# Patient Record
Sex: FEMALE | Race: WHITE | URBAN - NONMETROPOLITAN AREA
[De-identification: names, ages, dates, MRNs, and addresses within clinical notes are randomized per-mention and may not be internally consistent; named-entity substitution may affect disease eponyms.]

---

## 2022-02-01 ENCOUNTER — LAB OUTSIDE AN ENCOUNTER (OUTPATIENT)
Dept: URBAN - NONMETROPOLITAN AREA CLINIC 2 | Facility: CLINIC | Age: 62
End: 2022-02-01

## 2022-02-01 ENCOUNTER — WEB ENCOUNTER (OUTPATIENT)
Dept: URBAN - NONMETROPOLITAN AREA CLINIC 2 | Facility: CLINIC | Age: 62
End: 2022-02-01

## 2022-02-01 ENCOUNTER — OFFICE VISIT (OUTPATIENT)
Dept: URBAN - NONMETROPOLITAN AREA CLINIC 2 | Facility: CLINIC | Age: 62
End: 2022-02-01

## 2022-02-01 DIAGNOSIS — R19.7 DIARRHEA, UNSPECIFIED TYPE: ICD-10-CM

## 2022-02-01 DIAGNOSIS — Z12.11 SCREENING FOR COLON CANCER: ICD-10-CM

## 2022-02-01 DIAGNOSIS — K52.839 MICROSCOPIC COLITIS, UNSPECIFIED MICROSCOPIC COLITIS TYPE: ICD-10-CM

## 2022-02-01 PROCEDURE — 99203 OFFICE O/P NEW LOW 30 MIN: CPT | Performed by: INTERNAL MEDICINE

## 2022-02-01 PROCEDURE — 99243 OFF/OP CNSLTJ NEW/EST LOW 30: CPT | Performed by: INTERNAL MEDICINE

## 2022-02-01 RX ORDER — SODIUM, POTASSIUM,MAG SULFATES 17.5-3.13G
354 ML SOLUTION, RECONSTITUTED, ORAL ORAL
Qty: 354 MILLILITER | Refills: 0 | OUTPATIENT
Start: 2022-02-01 | End: 2022-02-02

## 2022-02-01 NOTE — HPI-TODAY'S VISIT:
2/1/2022 The patient is a 61-year-old female who is referred by Dr. Kiara Paige for evaluation and treatment of chronic diarrhea.  A copy of this consultation will be sent to the referring physician.  The patient is a 61-year-old female who was diagnosed with microscopic colitis about 3 years ago.  She was seen by Dr. Torres, and at that time had blood work done along with a colonoscopy.  We do not have these biopsy results.  She was not told whether it was lymphocytic colitis or collagenous colitis.  Her mother does have lymphocytic colitis.  She did not want to take steroids, so she treated her symptoms with diet.  She eliminated gluten and dairy.  She is also decreased the amount of fiber in her diet.  This does help somewhat.  She will occasionally take an Imodium, but this teen tends to cause her to have constipation.  Over the past 2 days, she is also been constipated.  This is definitely not normal for her, but she has had difficulty having a bowel movement.  We have discussed taking some Metamucil type fiber.  She does eat a very healthy high-fiber diet, and we have discussed taking psyllium fiber in addition due to the fact she has eliminated some of the fiber to decrease her diarrhea.  At this point, I also think we should recheck her colonoscopy with biopsies to determine if we are treating IBS versus lymphocytic colitis.  We have discussed treatment including steroids.  We are going to check her labs today including celiac titers as well.  We will see her back in the office after her colonoscopy for further evaluation.  She denies any weight loss or blood in her stool.  The diarrhea does not wake her up at night.

## 2022-02-03 LAB
A/G RATIO: 1.7
ALBUMIN: 4.5
ALKALINE PHOSPHATASE: 80
ALT (SGPT): 12
AST (SGOT): 10
BASO (ABSOLUTE): 0
BASOS: 0
BILIRUBIN, TOTAL: 0.4
BUN/CREATININE RATIO: 13
BUN: 10
CALCIUM: 9.4
CARBON DIOXIDE, TOTAL: 25
CHLORIDE: 106
CREATININE: 0.76
DEAMIDATED GLIADIN ABS, IGA: 7
DEAMIDATED GLIADIN ABS, IGG: 3
EGFR IF AFRICN AM: 97
EGFR IF NONAFRICN AM: 84
ENDOMYSIAL ANTIBODY IGA: NEGATIVE
EOS (ABSOLUTE): 0.1
EOS: 2
GLOBULIN, TOTAL: 2.7
GLUCOSE: 83
HEMATOCRIT: 40.8
HEMATOLOGY COMMENTS:: (no result)
HEMOGLOBIN: 14
IMMATURE CELLS: (no result)
IMMATURE GRANS (ABS): 0
IMMATURE GRANULOCYTES: 0
IMMUNOGLOBULIN A, QN, SERUM: 158
LYMPHS (ABSOLUTE): 1.5
LYMPHS: 33
MCH: 31.7
MCHC: 34.3
MCV: 93
MONOCYTES(ABSOLUTE): 0.2
MONOCYTES: 4
NEUTROPHILS (ABSOLUTE): 2.7
NEUTROPHILS: 61
NRBC: (no result)
PLATELETS: 308
POTASSIUM: 4.4
PROTEIN, TOTAL: 7.2
RBC: 4.41
RDW: 12.6
SODIUM: 144
T-TRANSGLUTAMINASE (TTG) IGA: <2
T-TRANSGLUTAMINASE (TTG) IGG: 4
WBC: 4.5

## 2022-02-18 ENCOUNTER — TELEPHONE ENCOUNTER (OUTPATIENT)
Dept: URBAN - NONMETROPOLITAN AREA CLINIC 2 | Facility: CLINIC | Age: 62
End: 2022-02-18

## 2022-04-20 ENCOUNTER — OFFICE VISIT (OUTPATIENT)
Dept: URBAN - NONMETROPOLITAN AREA SURGERY CENTER 1 | Facility: SURGERY CENTER | Age: 62
End: 2022-04-20

## 2022-04-20 DIAGNOSIS — R19.7 ACUTE DIARRHEA: ICD-10-CM

## 2022-04-20 DIAGNOSIS — K52.832 FOCAL LYMPHOCYTIC COLITIS: ICD-10-CM

## 2022-04-20 PROCEDURE — G8907 PT DOC NO EVENTS ON DISCHARG: HCPCS | Performed by: INTERNAL MEDICINE

## 2022-04-20 PROCEDURE — 45380 COLONOSCOPY AND BIOPSY: CPT | Performed by: INTERNAL MEDICINE

## 2022-04-28 ENCOUNTER — TELEPHONE ENCOUNTER (OUTPATIENT)
Dept: URBAN - METROPOLITAN AREA CLINIC 92 | Facility: CLINIC | Age: 62
End: 2022-04-28

## 2022-07-21 ENCOUNTER — OFFICE VISIT (OUTPATIENT)
Dept: URBAN - NONMETROPOLITAN AREA CLINIC 2 | Facility: CLINIC | Age: 62
End: 2022-07-21

## 2022-07-21 VITALS — HEIGHT: 66 IN

## 2022-07-21 DIAGNOSIS — R19.7 DIARRHEA, UNSPECIFIED TYPE: ICD-10-CM

## 2022-07-21 DIAGNOSIS — Z12.11 SCREENING FOR COLON CANCER: ICD-10-CM

## 2022-07-21 DIAGNOSIS — K52.839 MICROSCOPIC COLITIS, UNSPECIFIED MICROSCOPIC COLITIS TYPE: ICD-10-CM

## 2022-07-21 PROCEDURE — 99214 OFFICE O/P EST MOD 30 MIN: CPT | Performed by: NURSE PRACTITIONER

## 2022-07-21 RX ORDER — BUDESONIDE 3 MG/1
3 CAP PO DAILY CAPSULE, COATED PELLETS ORAL ONCE A DAY
Qty: 90 CAPSULE | Refills: 1 | OUTPATIENT

## 2022-07-21 NOTE — HPI-TODAY'S VISIT:
2/1/2022 The patient is a 61-year-old female who is referred by Dr. Kiara Paige for evaluation and treatment of chronic diarrhea.  A copy of this consultation will be sent to the referring physician.  The patient is a 61-year-old female who was diagnosed with microscopic colitis about 3 years ago.  She was seen by Dr. Torres, and at that time had blood work done along with a colonoscopy.  We do not have these biopsy results.  She was not told whether it was lymphocytic colitis or collagenous colitis.  Her mother does have lymphocytic colitis.  She did not want to take steroids, so she treated her symptoms with diet.  She eliminated gluten and dairy.  She is also decreased the amount of fiber in her diet.  This does help somewhat.  She will occasionally take an Imodium, but this teen tends to cause her to have constipation.  Over the past 2 days, she is also been constipated.  This is definitely not normal for her, but she has had difficulty having a bowel movement.  We have discussed taking some Metamucil type fiber.  She does eat a very healthy high-fiber diet, and we have discussed taking psyllium fiber in addition due to the fact she has eliminated some of the fiber to decrease her diarrhea.  At this point, I also think we should recheck her colonoscopy with biopsies to determine if we are treating IBS versus lymphocytic colitis.  We have discussed treatment including steroids.  We are going to check her labs today including celiac titers as well.  We will see her back in the office after her colonoscopy for further evaluation.  She denies any weight loss or blood in her stool.  The diarrhea does not wake her up at night. 7/21/2022 Keisha presents for follow-up of lymphocytic colitis.  Since her last visit her colonoscopy reveals lymphocytic colitis on random biopsies.  Her celiac panel was normal.  She continues to struggle with multiple loose bowel movements daily.  She has not tried the Florastor.  She is taking an organic psyllium found powder which she drinks in her aloe vera and coconut juice.  Today we have had a long discussion regarding her symptoms.  Her mother is tremendously better on Florastor daily.  She agrees to start Florastor 1-2 daily.  If she does have to take an antibiotic for an ear infection I would increase that to twice daily regularly while on the antibiotic and for a month afterward.  MB

## 2022-10-27 ENCOUNTER — OFFICE VISIT (OUTPATIENT)
Dept: URBAN - NONMETROPOLITAN AREA CLINIC 2 | Facility: CLINIC | Age: 62
End: 2022-10-27

## 2022-10-27 VITALS
WEIGHT: 155 LBS | SYSTOLIC BLOOD PRESSURE: 103 MMHG | TEMPERATURE: 97.8 F | DIASTOLIC BLOOD PRESSURE: 67 MMHG | HEART RATE: 54 BPM | HEIGHT: 66 IN | BODY MASS INDEX: 24.91 KG/M2

## 2022-10-27 DIAGNOSIS — Z12.11 SCREENING FOR COLON CANCER: ICD-10-CM

## 2022-10-27 DIAGNOSIS — R19.7 DIARRHEA, UNSPECIFIED TYPE: ICD-10-CM

## 2022-10-27 DIAGNOSIS — K52.839 MICROSCOPIC COLITIS, UNSPECIFIED MICROSCOPIC COLITIS TYPE: ICD-10-CM

## 2022-10-27 PROCEDURE — 99212 OFFICE O/P EST SF 10 MIN: CPT | Performed by: NURSE PRACTITIONER

## 2022-10-27 RX ORDER — BUDESONIDE 3 MG/1
3 CAP PO DAILY CAPSULE, COATED PELLETS ORAL ONCE A DAY
Qty: 90 CAPSULE | Refills: 1

## 2022-10-27 RX ORDER — BUDESONIDE 3 MG/1
3 CAP PO DAILY CAPSULE, COATED PELLETS ORAL ONCE A DAY
Qty: 90 CAPSULE | Refills: 1 | Status: ACTIVE | COMMUNITY

## 2022-10-27 RX ORDER — COLESTIPOL HYDROCHLORIDE 1 G/1
1 TABLET TWICE DAILY FOR DIARRHEA TABLET, FILM COATED ORAL
Qty: 60 TABLET | Refills: 3 | OUTPATIENT
Start: 2022-10-27

## 2022-10-27 NOTE — HPI-TODAY'S VISIT:
2/1/2022 The patient is a 61-year-old female who is referred by Dr. Kiara Paige for evaluation and treatment of chronic diarrhea.  A copy of this consultation will be sent to the referring physician.  The patient is a 61-year-old female who was diagnosed with microscopic colitis about 3 years ago.  She was seen by Dr. Torres, and at that time had blood work done along with a colonoscopy.  We do not have these biopsy results.  She was not told whether it was lymphocytic colitis or collagenous colitis.  Her mother does have lymphocytic colitis.  She did not want to take steroids, so she treated her symptoms with diet.  She eliminated gluten and dairy.  She is also decreased the amount of fiber in her diet.  This does help somewhat.  She will occasionally take an Imodium, but this teen tends to cause her to have constipation.  Over the past 2 days, she is also been constipated.  This is definitely not normal for her, but she has had difficulty having a bowel movement.  We have discussed taking some Metamucil type fiber.  She does eat a very healthy high-fiber diet, and we have discussed taking psyllium fiber in addition due to the fact she has eliminated some of the fiber to decrease her diarrhea.  At this point, I also think we should recheck her colonoscopy with biopsies to determine if we are treating IBS versus lymphocytic colitis.  We have discussed treatment including steroids.  We are going to check her labs today including celiac titers as well.  We will see her back in the office after her colonoscopy for further evaluation.  She denies any weight loss or blood in her stool.  The diarrhea does not wake her up at night. 7/21/2022 Keisha presents for follow-up of lymphocytic colitis.  Since her last visit her colonoscopy reveals lymphocytic colitis on random biopsies.  Her celiac panel was normal.  She continues to struggle with multiple loose bowel movements daily.  She has not tried the Florastor.  She is taking an organic psyllium found powder which she drinks in her aloe vera and coconut juice.  Today we have had a long discussion regarding her symptoms.  Her mother is tremendously better on Florastor daily.  She agrees to start Florastor 1-2 daily.  If she does have to take an antibiotic for an ear infection I would increase that to twice daily regularly while on the antibiotic and for a month afterward.  MB 10/27/2022 Keisha presents for follow-up of lymphocytic colitis.  Since her last visit she has been doing Florastor twice daily along with increasing her nutritional fiber with no relief.  She continues to have multiple loose bowel movements daily.  Today we have discussed colestipol versus budesonide she agrees to give colestipol 1 g twice daily a try, if no relief she agrees to an 8-week course of budesonide.  MB

## 2023-01-26 ENCOUNTER — OFFICE VISIT (OUTPATIENT)
Dept: URBAN - NONMETROPOLITAN AREA CLINIC 2 | Facility: CLINIC | Age: 63
End: 2023-01-26

## 2023-01-26 ENCOUNTER — LAB OUTSIDE AN ENCOUNTER (OUTPATIENT)
Dept: URBAN - NONMETROPOLITAN AREA CLINIC 2 | Facility: CLINIC | Age: 63
End: 2023-01-26

## 2023-01-26 ENCOUNTER — WEB ENCOUNTER (OUTPATIENT)
Dept: URBAN - NONMETROPOLITAN AREA CLINIC 2 | Facility: CLINIC | Age: 63
End: 2023-01-26

## 2023-01-26 VITALS
HEIGHT: 66 IN | WEIGHT: 150.8 LBS | DIASTOLIC BLOOD PRESSURE: 69 MMHG | TEMPERATURE: 98.7 F | HEART RATE: 58 BPM | SYSTOLIC BLOOD PRESSURE: 105 MMHG | BODY MASS INDEX: 24.23 KG/M2

## 2023-01-26 DIAGNOSIS — R10.11 RUQ ABDOMINAL PAIN: ICD-10-CM

## 2023-01-26 DIAGNOSIS — K52.839 MICROSCOPIC COLITIS, UNSPECIFIED MICROSCOPIC COLITIS TYPE: ICD-10-CM

## 2023-01-26 DIAGNOSIS — R11.0 NAUSEA: ICD-10-CM

## 2023-01-26 DIAGNOSIS — Z12.11 SCREENING FOR COLON CANCER: ICD-10-CM

## 2023-01-26 DIAGNOSIS — R19.7 DIARRHEA, UNSPECIFIED TYPE: ICD-10-CM

## 2023-01-26 PROBLEM — 305058001: Status: ACTIVE | Noted: 2022-02-01

## 2023-01-26 PROBLEM — 422587007: Status: ACTIVE | Noted: 2023-01-26

## 2023-01-26 PROBLEM — 301717006: Status: ACTIVE | Noted: 2023-01-26

## 2023-01-26 PROBLEM — 62315008: Status: ACTIVE | Noted: 2022-02-01

## 2023-01-26 PROBLEM — 235753003: Status: ACTIVE | Noted: 2022-02-01

## 2023-01-26 PROCEDURE — 99214 OFFICE O/P EST MOD 30 MIN: CPT | Performed by: NURSE PRACTITIONER

## 2023-01-26 RX ORDER — COLESTIPOL HYDROCHLORIDE 1 G/1
1 TABLET TWICE DAILY FOR DIARRHEA TABLET, FILM COATED ORAL
Qty: 60 TABLET | Refills: 3 | Status: ACTIVE | COMMUNITY
Start: 2022-10-27

## 2023-01-26 RX ORDER — BUDESONIDE 3 MG/1
3 CAP PO DAILY CAPSULE, COATED PELLETS ORAL ONCE A DAY
Qty: 90 CAPSULE | Refills: 1 | Status: ACTIVE | COMMUNITY

## 2023-01-26 NOTE — HPI-TODAY'S VISIT:
2/1/2022 The patient is a 61-year-old female who is referred by Dr. Kiara Paige for evaluation and treatment of chronic diarrhea.  A copy of this consultation will be sent to the referring physician.  The patient is a 61-year-old female who was diagnosed with microscopic colitis about 3 years ago.  She was seen by Dr. Torres, and at that time had blood work done along with a colonoscopy.  We do not have these biopsy results.  She was not told whether it was lymphocytic colitis or collagenous colitis.  Her mother does have lymphocytic colitis.  She did not want to take steroids, so she treated her symptoms with diet.  She eliminated gluten and dairy.  She is also decreased the amount of fiber in her diet.  This does help somewhat.  She will occasionally take an Imodium, but this teen tends to cause her to have constipation.  Over the past 2 days, she is also been constipated.  This is definitely not normal for her, but she has had difficulty having a bowel movement.  We have discussed taking some Metamucil type fiber.  She does eat a very healthy high-fiber diet, and we have discussed taking psyllium fiber in addition due to the fact she has eliminated some of the fiber to decrease her diarrhea.  At this point, I also think we should recheck her colonoscopy with biopsies to determine if we are treating IBS versus lymphocytic colitis.  We have discussed treatment including steroids.  We are going to check her labs today including celiac titers as well.  We will see her back in the office after her colonoscopy for further evaluation.  She denies any weight loss or blood in her stool.  The diarrhea does not wake her up at night. 7/21/2022 Keisha presents for follow-up of lymphocytic colitis.  Since her last visit her colonoscopy reveals lymphocytic colitis on random biopsies.  Her celiac panel was normal.  She continues to struggle with multiple loose bowel movements daily.  She has not tried the Florastor.  She is taking an organic psyllium found powder which she drinks in her aloe vera and coconut juice.  Today we have had a long discussion regarding her symptoms.  Her mother is tremendously better on Florastor daily.  She agrees to start Florastor 1-2 daily.  If she does have to take an antibiotic for an ear infection I would increase that to twice daily regularly while on the antibiotic and for a month afterward.  MB 10/27/2022 Keisha presents for follow-up of lymphocytic colitis.  Since her last visit she has been doing Florastor twice daily along with increasing her nutritional fiber with no relief.  She continues to have multiple loose bowel movements daily.  Today we have discussed colestipol versus budesonide she agrees to give colestipol 1 g twice daily a try, if no relief she agrees to an 8-week course of budesonide.  MB  1/26/2023 Keisha presents for follow-up of microscopic colitis.  She is on colestipol 1 g daily with resolution of her symptoms.  She did not want to try the budesonide.  She is taking Florastor.  Recently she has had increased right upper quadrant abdominal pain with radiation to her back.  She has a history of gallbladder disease diagnosed years ago however she did not pursue cholecystectomy, we do not have this work-up.  She feels that her symptoms are similar.  She does have some nausea associated with the pain.  She has not had an upper endoscopy in the recent past.  Today she would like to pursue gallbladder work-up, if this is normal she agrees to upper endoscopy plus or minus Pepcid.  Would continue to avoid food triggers, she is a vegetarian and noticed this worse after meals with butter. Today she is doing well otherwise.  MB

## 2023-01-27 LAB
A/G RATIO: 1.6
ABSOLUTE BASOPHILS: 22
ABSOLUTE EOSINOPHILS: 62
ABSOLUTE LYMPHOCYTES: 1404
ABSOLUTE MONOCYTES: 220
ABSOLUTE NEUTROPHILS: 2693
ALBUMIN: 3.9
ALKALINE PHOSPHATASE: 57
ALT (SGPT): 9
AST (SGOT): 13
BASOPHILS: 0.5
BILIRUBIN, TOTAL: 0.5
BUN/CREATININE RATIO: (no result)
BUN: 10
CALCIUM: 9.1
CARBON DIOXIDE, TOTAL: 31
CHLORIDE: 109
CREATININE: 0.72
EGFR: 93
EOSINOPHILS: 1.4
GLOBULIN, TOTAL: 2.4
GLUCOSE: 88
H PYLORI BREATH TEST: NOT DETECTED
H. PYLORI BREATH TEST: NOT DETECTED
HEMATOCRIT: 38.6
HEMOGLOBIN: 13.3
IMMUNOGLOBULIN A: 150
INTERPRETATION: (no result)
INTERPRETATION: NOT DETECTED
LIPASE: 26
LYMPHOCYTES: 31.9
MCH: 31
MCHC: 34.5
MCV: 90
MONOCYTES: 5
MPV: 10.5
NEUTROPHILS: 61.2
PLATELET COUNT: 260
POTASSIUM: 4.4
PROTEIN, TOTAL: 6.3
RDW: 12
RED BLOOD CELL COUNT: 4.29
SODIUM: 143
TISSUE TRANSGLUTAMINASE AB, IGA: <1
WHITE BLOOD CELL COUNT: 4.4

## 2023-02-14 ENCOUNTER — TELEPHONE ENCOUNTER (OUTPATIENT)
Dept: URBAN - METROPOLITAN AREA CLINIC 92 | Facility: CLINIC | Age: 63
End: 2023-02-14

## 2023-02-14 PROBLEM — 197433003: Status: ACTIVE | Noted: 2023-02-14

## 2023-06-01 ENCOUNTER — OFFICE VISIT (OUTPATIENT)
Dept: URBAN - NONMETROPOLITAN AREA CLINIC 2 | Facility: CLINIC | Age: 63
End: 2023-06-01

## 2023-10-30 ENCOUNTER — OFFICE VISIT (OUTPATIENT)
Dept: URBAN - NONMETROPOLITAN AREA CLINIC 2 | Facility: CLINIC | Age: 63
End: 2023-10-30

## 2024-02-26 ENCOUNTER — LAB (OUTPATIENT)
Dept: URBAN - NONMETROPOLITAN AREA CLINIC 2 | Facility: CLINIC | Age: 64
End: 2024-02-26

## 2024-02-26 ENCOUNTER — OV EP (OUTPATIENT)
Dept: URBAN - NONMETROPOLITAN AREA CLINIC 2 | Facility: CLINIC | Age: 64
End: 2024-02-26
Payer: MEDICARE

## 2024-02-26 VITALS
TEMPERATURE: 98.7 F | HEART RATE: 64 BPM | DIASTOLIC BLOOD PRESSURE: 62 MMHG | WEIGHT: 148.8 LBS | SYSTOLIC BLOOD PRESSURE: 97 MMHG | HEIGHT: 66 IN | BODY MASS INDEX: 23.91 KG/M2

## 2024-02-26 DIAGNOSIS — K82.4 GALLBLADDER POLYP: ICD-10-CM

## 2024-02-26 DIAGNOSIS — R10.11 RUQ ABDOMINAL PAIN: ICD-10-CM

## 2024-02-26 DIAGNOSIS — R19.7 ACUTE DIARRHEA: ICD-10-CM

## 2024-02-26 DIAGNOSIS — K52.89 MICROSCOPIC COLITIS: ICD-10-CM

## 2024-02-26 PROCEDURE — 99214 OFFICE O/P EST MOD 30 MIN: CPT | Performed by: NURSE PRACTITIONER

## 2024-02-26 RX ORDER — BUDESONIDE 3 MG/1
3 CAP PO DAILY CAPSULE, COATED PELLETS ORAL ONCE A DAY
Qty: 90 CAPSULE | Refills: 1 | Status: ON HOLD | COMMUNITY

## 2024-02-26 RX ORDER — COLESTIPOL HYDROCHLORIDE 1 G/1
1 TABLET TWICE DAILY FOR DIARRHEA TABLET, FILM COATED ORAL
Qty: 60 TABLET | Refills: 3 | Status: ON HOLD | COMMUNITY
Start: 2022-10-27

## 2024-02-26 NOTE — HPI-TODAY'S VISIT:
2/1/2022 The patient is a 61-year-old female who is referred by Dr. Kiara Paige for evaluation and treatment of chronic diarrhea.  A copy of this consultation will be sent to the referring physician.  The patient is a 61-year-old female who was diagnosed with microscopic colitis about 3 years ago.  She was seen by Dr. Torres, and at that time had blood work done along with a colonoscopy.  We do not have these biopsy results.  She was not told whether it was lymphocytic colitis or collagenous colitis.  Her mother does have lymphocytic colitis.  She did not want to take steroids, so she treated her symptoms with diet.  She eliminated gluten and dairy.  She is also decreased the amount of fiber in her diet.  This does help somewhat.  She will occasionally take an Imodium, but this teen tends to cause her to have constipation.  Over the past 2 days, she is also been constipated.  This is definitely not normal for her, but she has had difficulty having a bowel movement.  We have discussed taking some Metamucil type fiber.  She does eat a very healthy high-fiber diet, and we have discussed taking psyllium fiber in addition due to the fact she has eliminated some of the fiber to decrease her diarrhea.  At this point, I also think we should recheck her colonoscopy with biopsies to determine if we are treating IBS versus lymphocytic colitis.  We have discussed treatment including steroids.  We are going to check her labs today including celiac titers as well.  We will see her back in the office after her colonoscopy for further evaluation.  She denies any weight loss or blood in her stool.  The diarrhea does not wake her up at night. 7/21/2022 Keisha presents for follow-up of lymphocytic colitis.  Since her last visit her colonoscopy reveals lymphocytic colitis on random biopsies.  Her celiac panel was normal.  She continues to struggle with multiple loose bowel movements daily.  She has not tried the Florastor.  She is taking an organic psyllium found powder which she drinks in her aloe vera and coconut juice.  Today we have had a long discussion regarding her symptoms.  Her mother is tremendously better on Florastor daily.  She agrees to start Florastor 1-2 daily.  If she does have to take an antibiotic for an ear infection I would increase that to twice daily regularly while on the antibiotic and for a month afterward.  MB 10/27/2022 Keisha presents for follow-up of lymphocytic colitis.  Since her last visit she has been doing Florastor twice daily along with increasing her nutritional fiber with no relief.  She continues to have multiple loose bowel movements daily.  Today we have discussed colestipol versus budesonide she agrees to give colestipol 1 g twice daily a try, if no relief she agrees to an 8-week course of budesonide.  MB  1/26/2023 Keisha presents for follow-up of microscopic colitis.  She is on colestipol 1 g daily with resolution of her symptoms.  She did not want to try the budesonide.  She is taking Florastor.  Recently she has had increased right upper quadrant abdominal pain with radiation to her back.  She has a history of gallbladder disease diagnosed years ago however she did not pursue cholecystectomy, we do not have this work-up.  She feels that her symptoms are similar.  She does have some nausea associated with the pain.  She has not had an upper endoscopy in the recent past.  Today she would like to pursue gallbladder work-up, if this is normal she agrees to upper endoscopy plus or minus Pepcid.  Would continue to avoid food triggers, she is a vegetarian and noticed this worse after meals with butter. Today she is doing well otherwise.  MB 2/26/2024 Keisha presents for follow-up of lymphocytic colitis.  She is off all medications.  If she takes psyllium and aloe and is careful with her diet she does well.  She is interested in meeting with a nutritionist.  She is due for repeat ultrasound of her gallbladder.  Today she denies any new GI complaints.  MB